# Patient Record
Sex: FEMALE | Race: WHITE | Employment: OTHER | URBAN - METROPOLITAN AREA
[De-identification: names, ages, dates, MRNs, and addresses within clinical notes are randomized per-mention and may not be internally consistent; named-entity substitution may affect disease eponyms.]

---

## 2020-12-28 ENCOUNTER — HOSPITAL ENCOUNTER (EMERGENCY)
Age: 75
Discharge: HOME | End: 2020-12-28
Attending: EMERGENCY MEDICINE
Payer: MEDICARE

## 2020-12-28 VITALS
WEIGHT: 133 LBS | TEMPERATURE: 97.8 F | DIASTOLIC BLOOD PRESSURE: 74 MMHG | BODY MASS INDEX: 24.48 KG/M2 | SYSTOLIC BLOOD PRESSURE: 150 MMHG | HEIGHT: 62 IN | OXYGEN SATURATION: 99 % | HEART RATE: 79 BPM | RESPIRATION RATE: 22 BRPM

## 2020-12-28 DIAGNOSIS — R55 SYNCOPE AND COLLAPSE: Primary | ICD-10-CM

## 2020-12-28 LAB
ALBUMIN SERPL-MCNC: 3.6 G/DL (ref 3.2–4.6)
ALBUMIN/GLOB SERPL: 1.1 {RATIO} (ref 1.2–3.5)
ALP SERPL-CCNC: 157 U/L (ref 50–136)
ALT SERPL-CCNC: 63 U/L (ref 12–65)
ANION GAP SERPL CALC-SCNC: 4 MMOL/L (ref 7–16)
AST SERPL-CCNC: 37 U/L (ref 15–37)
ATRIAL RATE: 71 BPM
BASOPHILS # BLD: 0.1 K/UL (ref 0–0.2)
BASOPHILS NFR BLD: 1 % (ref 0–2)
BILIRUB SERPL-MCNC: 0.2 MG/DL (ref 0.2–1.1)
BUN SERPL-MCNC: 14 MG/DL (ref 8–23)
CALCIUM SERPL-MCNC: 9.4 MG/DL (ref 8.3–10.4)
CALCULATED P AXIS, ECG09: 65 DEGREES
CALCULATED R AXIS, ECG10: 56 DEGREES
CALCULATED T AXIS, ECG11: 68 DEGREES
CHLORIDE SERPL-SCNC: 110 MMOL/L (ref 98–107)
CO2 SERPL-SCNC: 30 MMOL/L (ref 21–32)
CREAT SERPL-MCNC: 1.09 MG/DL (ref 0.6–1)
DIAGNOSIS, 93000: NORMAL
DIFFERENTIAL METHOD BLD: ABNORMAL
EOSINOPHIL # BLD: 0.3 K/UL (ref 0–0.8)
EOSINOPHIL NFR BLD: 3 % (ref 0.5–7.8)
ERYTHROCYTE [DISTWIDTH] IN BLOOD BY AUTOMATED COUNT: 14.1 % (ref 11.9–14.6)
GLOBULIN SER CALC-MCNC: 3.3 G/DL (ref 2.3–3.5)
GLUCOSE SERPL-MCNC: 124 MG/DL (ref 65–100)
HCT VFR BLD AUTO: 37 % (ref 35.8–46.3)
HGB BLD-MCNC: 11.9 G/DL (ref 11.7–15.4)
IMM GRANULOCYTES # BLD AUTO: 0 K/UL (ref 0–0.5)
IMM GRANULOCYTES NFR BLD AUTO: 0 % (ref 0–5)
LYMPHOCYTES # BLD: 2.6 K/UL (ref 0.5–4.6)
LYMPHOCYTES NFR BLD: 26 % (ref 13–44)
MAGNESIUM SERPL-MCNC: 2.3 MG/DL (ref 1.8–2.4)
MCH RBC QN AUTO: 29.8 PG (ref 26.1–32.9)
MCHC RBC AUTO-ENTMCNC: 32.2 G/DL (ref 31.4–35)
MCV RBC AUTO: 92.7 FL (ref 79.6–97.8)
MONOCYTES # BLD: 0.9 K/UL (ref 0.1–1.3)
MONOCYTES NFR BLD: 9 % (ref 4–12)
NEUTS SEG # BLD: 6 K/UL (ref 1.7–8.2)
NEUTS SEG NFR BLD: 60 % (ref 43–78)
NRBC # BLD: 0 K/UL (ref 0–0.2)
P-R INTERVAL, ECG05: 146 MS
PLATELET # BLD AUTO: 212 K/UL (ref 150–450)
PMV BLD AUTO: 11.2 FL (ref 9.4–12.3)
POTASSIUM SERPL-SCNC: 3.8 MMOL/L (ref 3.5–5.1)
PROT SERPL-MCNC: 6.9 G/DL (ref 6.3–8.2)
Q-T INTERVAL, ECG07: 382 MS
QRS DURATION, ECG06: 82 MS
QTC CALCULATION (BEZET), ECG08: 415 MS
RBC # BLD AUTO: 3.99 M/UL (ref 4.05–5.2)
SODIUM SERPL-SCNC: 144 MMOL/L (ref 136–145)
TROPONIN-HIGH SENSITIVITY: 7.8 PG/ML (ref 0–14)
TROPONIN-HIGH SENSITIVITY: 8.4 PG/ML (ref 0–14)
VENTRICULAR RATE, ECG03: 71 BPM
WBC # BLD AUTO: 9.9 K/UL (ref 4.3–11.1)

## 2020-12-28 PROCEDURE — 99285 EMERGENCY DEPT VISIT HI MDM: CPT

## 2020-12-28 PROCEDURE — 84484 ASSAY OF TROPONIN QUANT: CPT

## 2020-12-28 PROCEDURE — 80053 COMPREHEN METABOLIC PANEL: CPT

## 2020-12-28 PROCEDURE — 74011250636 HC RX REV CODE- 250/636: Performed by: EMERGENCY MEDICINE

## 2020-12-28 PROCEDURE — 85025 COMPLETE CBC W/AUTO DIFF WBC: CPT

## 2020-12-28 PROCEDURE — 93005 ELECTROCARDIOGRAM TRACING: CPT | Performed by: EMERGENCY MEDICINE

## 2020-12-28 PROCEDURE — 96360 HYDRATION IV INFUSION INIT: CPT

## 2020-12-28 PROCEDURE — 83735 ASSAY OF MAGNESIUM: CPT

## 2020-12-28 RX ADMIN — SODIUM CHLORIDE 1000 ML: 900 INJECTION, SOLUTION INTRAVENOUS at 01:18

## 2020-12-28 NOTE — ED PROVIDER NOTES
Patient presents to the ER for having a syncopal episode. States she got out of the bed earlier to take her morning medications when she all of a sudden became lightheaded dizzy. States she passed out.  found her passed. EMS was called. Upon arrival her initial blood pressure was in the 95M systolically. Blood sugar was stable. Blood pressure improved without any treatment. She denies any associated chest pain, palpitations. Denies any recent illnesses. States she actually was able to [facility] 2 miles earlier today\" without any difficulty. The history is provided by the patient. Syncope   This is a new problem. The current episode started 1 to 2 hours ago. The problem has been resolved. She lost consciousness for a period of less than one minute. Associated symptoms include light-headedness. Pertinent negatives include no visual change, no chest pain, no confusion, no malaise/fatigue, no abdominal pain, no vomiting, no congestion, no headaches, no back pain, no focal weakness, no seizures and no melena. She has tried nothing for the symptoms. Her past medical history is significant for syncope. Her past medical history does not include no DM, no HTN or no vertigo. No past medical history on file. No past surgical history on file. No family history on file.     Social History     Socioeconomic History    Marital status:      Spouse name: Not on file    Number of children: Not on file    Years of education: Not on file    Highest education level: Not on file   Occupational History    Not on file   Social Needs    Financial resource strain: Not on file    Food insecurity     Worry: Not on file     Inability: Not on file    Transportation needs     Medical: Not on file     Non-medical: Not on file   Tobacco Use    Smoking status: Not on file   Substance and Sexual Activity    Alcohol use: Not on file    Drug use: Not on file    Sexual activity: Not on file   Lifestyle  Physical activity     Days per week: Not on file     Minutes per session: Not on file    Stress: Not on file   Relationships    Social connections     Talks on phone: Not on file     Gets together: Not on file     Attends [employer] service: Not on file     Active member of club or organization: Not on file     Attends meetings of clubs or organizations: Not on file     Relationship status: Not on file    Intimate partner violence     Fear of current or ex partner: Not on file     Emotionally abused: Not on file     Physically abused: Not on file     Forced sexual activity: Not on file   Other Topics Concern    Not on file   Social History Narrative    Not on file         ALLERGIES: Patient has no known allergies. Review of Systems   Constitutional: Negative for fatigue, malaise/fatigue and unexpected weight change. HENT: Negative for congestion and dental problem. Eyes: Negative for photophobia. Respiratory: Negative for chest tightness, shortness of breath and stridor. Cardiovascular: Positive for syncope. Negative for chest pain. Gastrointestinal: Negative for abdominal pain, melena, rectal pain and vomiting. Genitourinary: Negative for flank pain, vaginal discharge and vaginal pain. Musculoskeletal: Negative for back pain, joint swelling and myalgias. Skin: Negative for color change and pallor. Neurological: Positive for syncope and light-headedness. Negative for focal weakness, seizures and headaches. Hematological: Negative for adenopathy. Does not bruise/bleed easily. Psychiatric/Behavioral: Negative for behavioral problems and confusion. All other systems reviewed and are negative. Vitals:    12/28/20 0040   BP: 111/69   Pulse: 76   Resp: 16   SpO2: 99%   Weight: 60.3 kg (133 lb)   Height: 5' 2\" (1.575 m)            Physical Exam  Vitals signs and nursing note reviewed. Constitutional:       Appearance: Normal appearance.    HENT:      Head: Normocephalic and atraumatic. Nose: Nose normal.      Mouth/Throat:      Mouth: Mucous membranes are moist.      Pharynx: No oropharyngeal exudate. Eyes:      Extraocular Movements: Extraocular movements intact. Conjunctiva/sclera: Conjunctivae normal.      Pupils: Pupils are equal, round, and reactive to light. Neck:      Musculoskeletal: Normal range of motion and neck supple. No neck rigidity or muscular tenderness. Cardiovascular:      Rate and Rhythm: Normal rate and regular rhythm. Pulses: Normal pulses. Heart sounds: Normal heart sounds. No murmur. Pulmonary:      Effort: Pulmonary effort is normal. No respiratory distress. Breath sounds: Normal breath sounds. No stridor. No wheezing. Abdominal:      General: Abdomen is flat. Bowel sounds are normal. There is no distension. Palpations: Abdomen is soft. There is no mass. Hernia: No hernia is present. Musculoskeletal: Normal range of motion. General: No tenderness, deformity or signs of injury. Skin:     General: Skin is warm and dry. Capillary Refill: Capillary refill takes less than 2 seconds. Coloration: Skin is not jaundiced or pale. Neurological:      General: No focal deficit present. Mental Status: She is alert and oriented to person, place, and time. Mental status is at baseline. Cranial Nerves: No cranial nerve deficit. MDM  Number of Diagnoses or Management Options  Syncope and collapse  Diagnosis management comments: Well-appearing here. Initial vital signs are stable. Will check screening labs and EKG. Continue to monitor symptoms    2:55 AM  Labs fairly stable. Given IV fluids. No orthostasis after fluids.          Amount and/or Complexity of Data Reviewed  Clinical lab tests: ordered and reviewed    Risk of Complications, Morbidity, and/or Mortality  Presenting problems: moderate  Diagnostic procedures: low  Management options: moderate    Patient Progress  Patient progress: improved         Procedures          Results Include:    Recent Results (from the past 24 hour(s))   CBC WITH AUTOMATED DIFF    Collection Time: 12/28/20 12:46 AM   Result Value Ref Range    WBC 9.9 4.3 - 11.1 K/uL    RBC 3.99 (L) 4.05 - 5.2 M/uL    HGB 11.9 11.7 - 15.4 g/dL    HCT 37.0 35.8 - 46.3 %    MCV 92.7 79.6 - 97.8 FL    MCH 29.8 26.1 - 32.9 PG    MCHC 32.2 31.4 - 35.0 g/dL    RDW 14.1 11.9 - 14.6 %    PLATELET 511 545 - 651 K/uL    MPV 11.2 9.4 - 12.3 FL    ABSOLUTE NRBC 0.00 0.0 - 0.2 K/uL    DF AUTOMATED      NEUTROPHILS 60 43 - 78 %    LYMPHOCYTES 26 13 - 44 %    MONOCYTES 9 4.0 - 12.0 %    EOSINOPHILS 3 0.5 - 7.8 %    BASOPHILS 1 0.0 - 2.0 %    IMMATURE GRANULOCYTES 0 0.0 - 5.0 %    ABS. NEUTROPHILS 6.0 1.7 - 8.2 K/UL    ABS. LYMPHOCYTES 2.6 0.5 - 4.6 K/UL    ABS. MONOCYTES 0.9 0.1 - 1.3 K/UL    ABS. EOSINOPHILS 0.3 0.0 - 0.8 K/UL    ABS. BASOPHILS 0.1 0.0 - 0.2 K/UL    ABS. IMM. GRANS. 0.0 0.0 - 0.5 K/UL   METABOLIC PANEL, COMPREHENSIVE    Collection Time: 12/28/20 12:46 AM   Result Value Ref Range    Sodium 144 136 - 145 mmol/L    Potassium 3.8 3.5 - 5.1 mmol/L    Chloride 110 (H) 98 - 107 mmol/L    CO2 30 21 - 32 mmol/L    Anion gap 4 (L) 7 - 16 mmol/L    Glucose 124 (H) 65 - 100 mg/dL    BUN 14 8 - 23 MG/DL    Creatinine 1.09 (H) 0.6 - 1.0 MG/DL    GFR est AA >60 >60 ml/min/1.73m2    GFR est non-AA 52 (L) >60 ml/min/1.73m2    Calcium 9.4 8.3 - 10.4 MG/DL    Bilirubin, total 0.2 0.2 - 1.1 MG/DL    ALT (SGPT) 63 12 - 65 U/L    AST (SGOT) 37 15 - 37 U/L    Alk.  phosphatase 157 (H) 50 - 136 U/L    Protein, total 6.9 6.3 - 8.2 g/dL    Albumin 3.6 3.2 - 4.6 g/dL    Globulin 3.3 2.3 - 3.5 g/dL    A-G Ratio 1.1 (L) 1.2 - 3.5     MAGNESIUM    Collection Time: 12/28/20 12:46 AM   Result Value Ref Range    Magnesium 2.3 1.8 - 2.4 mg/dL   TROPONIN-HIGH SENSITIVITY    Collection Time: 12/28/20 12:46 AM   Result Value Ref Range    Troponin-High Sensitivity 7.8 0 - 14 pg/mL     Voice dictation software was used during the making of this note. This software is not perfect and grammatical and other typographical errors may be present. This note has been proofread, but may still contain errors.   Emily Stephen MD; 12/28/2020 @2:55 AM   ===================================================================

## 2020-12-28 NOTE — ED TRIAGE NOTES
Patient arrives via EMS from home. Ems was called out for fall. When ems arrived patient was unconscious, patient initial blood pressure was 60/40. Patient regained consciousness, attempted to stand up and pass out. patient arrives to ED alert and oriented, is able to recall most of the events tonight.

## 2020-12-28 NOTE — ED NOTES
I have reviewed discharge instructions with the patient. The patient verbalized understanding. Patient left ED via Discharge Method: ambulatory to Home with spouse  Opportunity for questions and clarification provided. Patient given 0 scripts. To continue your aftercare when you leave the hospital, you may receive an automated call from our care team to check in on how you are doing. This is a free service and part of our promise to provide the best care and service to meet your aftercare needs.  If you have questions, or wish to unsubscribe from this service please call [phone]. Thank you for Choosing our [facility] Emergency Department.

## 2020-12-28 NOTE — DISCHARGE INSTRUCTIONS
Drink plenty of fluids  Follow-up with your primary care physician  Return to the ER for any new, worsening or life-threatening symptoms